# Patient Record
Sex: FEMALE | Race: WHITE | NOT HISPANIC OR LATINO | URBAN - METROPOLITAN AREA
[De-identification: names, ages, dates, MRNs, and addresses within clinical notes are randomized per-mention and may not be internally consistent; named-entity substitution may affect disease eponyms.]

---

## 2020-03-10 ENCOUNTER — OFFICE VISIT (OUTPATIENT)
Dept: INTERNAL MEDICINE | Facility: CLINIC | Age: 60
End: 2020-03-10
Payer: COMMERCIAL

## 2020-03-10 VITALS
HEIGHT: 62 IN | SYSTOLIC BLOOD PRESSURE: 146 MMHG | HEART RATE: 99 BPM | OXYGEN SATURATION: 97 % | TEMPERATURE: 100 F | DIASTOLIC BLOOD PRESSURE: 84 MMHG | WEIGHT: 139.31 LBS | BODY MASS INDEX: 25.64 KG/M2

## 2020-03-10 DIAGNOSIS — N20.0 RENAL STONES: ICD-10-CM

## 2020-03-10 DIAGNOSIS — Z85.6 PERSONAL HISTORY OF CLL (CHRONIC LYMPHOCYTIC LEUKEMIA): ICD-10-CM

## 2020-03-10 DIAGNOSIS — R05.9 COUGH: ICD-10-CM

## 2020-03-10 DIAGNOSIS — R50.9 FEVER, UNSPECIFIED FEVER CAUSE: Primary | ICD-10-CM

## 2020-03-10 DIAGNOSIS — D75.839 THROMBOCYTOSIS: ICD-10-CM

## 2020-03-10 PROCEDURE — 3008F PR BODY MASS INDEX (BMI) DOCUMENTED: ICD-10-PCS | Mod: CPTII,S$GLB,, | Performed by: FAMILY MEDICINE

## 2020-03-10 PROCEDURE — 99999 PR PBB SHADOW E&M-NEW PATIENT-LVL III: CPT | Mod: PBBFAC,,, | Performed by: FAMILY MEDICINE

## 2020-03-10 PROCEDURE — 99203 PR OFFICE/OUTPT VISIT, NEW, LEVL III, 30-44 MIN: ICD-10-PCS | Mod: S$GLB,,, | Performed by: FAMILY MEDICINE

## 2020-03-10 PROCEDURE — 99203 OFFICE O/P NEW LOW 30 MIN: CPT | Mod: S$GLB,,, | Performed by: FAMILY MEDICINE

## 2020-03-10 PROCEDURE — 99999 PR PBB SHADOW E&M-NEW PATIENT-LVL III: ICD-10-PCS | Mod: PBBFAC,,, | Performed by: FAMILY MEDICINE

## 2020-03-10 PROCEDURE — 3008F BODY MASS INDEX DOCD: CPT | Mod: CPTII,S$GLB,, | Performed by: FAMILY MEDICINE

## 2020-03-10 RX ORDER — ZOLPIDEM TARTRATE 10 MG/1
10 TABLET ORAL
COMMUNITY
Start: 2019-06-18

## 2020-03-10 RX ORDER — BETHANECHOL CHLORIDE 50 MG/1
50 TABLET ORAL
COMMUNITY
Start: 2020-03-07 | End: 2021-03-07

## 2020-03-10 RX ORDER — NITROFURANTOIN 25; 75 MG/1; MG/1
50 CAPSULE ORAL
COMMUNITY
End: 2020-04-08

## 2020-03-10 RX ORDER — ONDANSETRON 4 MG/1
4 TABLET, ORALLY DISINTEGRATING ORAL EVERY 8 HOURS PRN
COMMUNITY
Start: 2020-03-07 | End: 2020-03-14

## 2020-03-10 RX ORDER — MULTIVITAMIN
1 TABLET ORAL
COMMUNITY

## 2020-03-10 RX ORDER — TRIAMCINOLONE ACETONIDE 55 UG/1
2 SPRAY, METERED NASAL
COMMUNITY

## 2020-03-12 ENCOUNTER — TELEPHONE (OUTPATIENT)
Dept: INTERNAL MEDICINE | Facility: CLINIC | Age: 60
End: 2020-03-12

## 2020-03-12 NOTE — TELEPHONE ENCOUNTER
Would she want to see another hematologist? I don't know what else to suggest. I don't think it is necessarily from the CLL but they need to figure out why the platelet level is so high.

## 2020-03-12 NOTE — TELEPHONE ENCOUNTER
Patient's daughter said she spoke to  office yesterday. He said it has nothing to do with CLL and to contact urologist. She seen her urologist yesterday and was taking off of macrobid. Her next appointment with infection disease is Tuesday. She said  said he was not concerned and told her he can see her in June. She just wanted to get your opinion.

## 2020-03-12 NOTE — TELEPHONE ENCOUNTER
Please call Dr. Tin Duncan's office, see if he can get her in soon. She needs to see her hematologist.

## 2020-03-12 NOTE — TELEPHONE ENCOUNTER
Patient's daughter Ingird came in concerned about patient. She states she is feeling worst today. Patient having headache and blood pressure keeps going up and down. She just did blood work at [facility] with infectious disease doctor. She states you was concerned about platelet count when it was in the 800's and now it is in the 900's. She wanted to know what you would like them to do. She states she is scared to bring her to the hospital and her sit in the waiting area exposed to everything going around.

## 2020-03-12 NOTE — TELEPHONE ENCOUNTER
Patient's daughter said she thinks she may be severely dehydrated. She will send you her lab results via [facility]. She wanted to know what you think about home health. She also said they we're able to get an appointment in April.

## 2020-03-15 NOTE — PROGRESS NOTES
Subjective:      Patient ID: Cindy Prince is a 59 y.o. female.    Chief Complaint: Establish Care; Urinary Tract Infection; Cough; Emesis; Chills; and Fever      Patient here today to establish care.  She has been very ill for several months now, has been hospital twice with diagnosis of UTI.  She has been unable to tolerate many p.o. antibiotics.  Today she reports still feeling feverish very weak, nauseated and vomiting.  She is having difficulty holding things down.  She reports this morning went to see an infectious disease specialist , he ordered multiple tests but they have no results yet on this.  She has history over 20 years ago of CLL and is followed by Dr. Devon canales for this, however he does not think that has anything to do with her recent illness.      Review of Systems   Constitutional: Positive for activity change, appetite change, fatigue and fever.   HENT: Positive for congestion, rhinorrhea, sinus pressure and sore throat.    Respiratory: Positive for cough. Negative for shortness of breath and wheezing.    Gastrointestinal: Positive for nausea and vomiting. Negative for abdominal pain.   Genitourinary: Positive for frequency.   Musculoskeletal: Positive for myalgias.   Skin: Negative for rash.   Neurological: Positive for weakness and headaches.     Past Medical History:   Diagnosis Date    Hyperkalemia     Insomnia     SVT (supraventricular tachycardia)           Past Surgical History:   Procedure Laterality Date    CHOLECYSTECTOMY      KNEE SURGERY Left     LAPAROSCOPIC SURGICAL REMOVAL OF DISTAL PANCREAS WITH REMOVAL OF SPLEEN      LITHOTRIPSY      TONSILLECTOMY       Family History   Problem Relation Age of Onset    Pneumonia Mother     Diabetes Father      Social History     Socioeconomic History    Marital status:      Spouse name: Not on file    Number of children: Not on file    Years of education: Not on file    Highest education level: Not on file  "  Occupational History    Not on file   Social Needs    Financial resource strain: Not on file    Food insecurity:     Worry: Not on file     Inability: Not on file    Transportation needs:     Medical: Not on file     Non-medical: Not on file   Tobacco Use    Smoking status: Never Smoker   Substance and Sexual Activity    Alcohol use: Not Currently    Drug use: Never    Sexual activity: Not on file   Lifestyle    Physical activity:     Days per week: Not on file     Minutes per session: Not on file    Stress: Not at all   Relationships    Social connections:     Talks on phone: Not on file     Gets together: Not on file     Attends Buddhist service: Not on file     Active member of club or organization: Not on file     Attends meetings of clubs or organizations: Not on file     Relationship status: Not on file   Other Topics Concern    Not on file   Social History Narrative    Not on file     Review of patient's allergies indicates:   Allergen Reactions    Cefdinir Other (See Comments)     Make tongue raw       Objective:       BP (!) 146/84 (BP Location: Right arm, Patient Position: Sitting, BP Method: Large (Manual))   Pulse 99   Temp 99.9 °F (37.7 °C) (Tympanic)   Ht 5' 2" (1.575 m)   Wt 63.2 kg (139 lb 5.3 oz)   SpO2 97%   BMI 25.48 kg/m²   Physical Exam   Constitutional: She is oriented to person, place, and time. Vital signs are normal. She appears well-developed and well-nourished. She appears distressed (Appears weak, in wheelchair).   HENT:   Head: Normocephalic and atraumatic.   Right Ear: Hearing, tympanic membrane, external ear and ear canal normal.   Left Ear: Hearing, tympanic membrane, external ear and ear canal normal.   Nose: Nose normal.   Mouth/Throat: Uvula is midline, oropharynx is clear and moist and mucous membranes are normal. No oropharyngeal exudate.   Eyes: Pupils are equal, round, and reactive to light. Conjunctivae and EOM are normal.   Cardiovascular: Normal rate, " regular rhythm, normal heart sounds and intact distal pulses.   No murmur heard.  Pulmonary/Chest: Effort normal and breath sounds normal. No respiratory distress.   Abdominal: Soft. Bowel sounds are normal.   Neurological: She is alert and oriented to person, place, and time.   Skin: Skin is warm and dry. Capillary refill takes less than 2 seconds. She is not diaphoretic.   Psychiatric: She has a normal mood and affect. Her behavior is normal. Judgment and thought content normal.   Vitals reviewed.    Assessment:     1. Fever, unspecified fever cause    2. Cough    3. Personal history of CLL (chronic lymphocytic leukemia)    4. Renal stones    5. Thrombocytosis      Plan:   Fever, unspecified fever cause    Cough    Personal history of CLL (chronic lymphocytic leukemia)    Renal stones    Thrombocytosis     Will await results from her visit this morning.  On review of chart in care everywhere her platelets have been increasing significantly, think she needs to see a hematologist regarding this.  Medication List with Changes/Refills   Current Medications    BETHANECHOL (URECHOLINE) 50 MG TABLET    Take 50 mg by mouth.    MULTIVITAMIN (THERAGRAN) PER TABLET    Take 1 tablet by mouth.    NITROFURANTOIN, MACROCRYSTAL-MONOHYDRATE, (MACROBID) 100 MG CAPSULE    Take 50 mg by mouth.    ONDANSETRON (ZOFRAN-ODT) 4 MG TBDL    Take 4 mg by mouth every 8 (eight) hours as needed.    TRIAMCINOLONE (NASACORT) 55 MCG NASAL INHALER    2 sprays by Nasal route.    ZOLPIDEM (AMBIEN) 10 MG TAB    Take 10 mg by mouth.

## 2020-03-23 ENCOUNTER — TELEPHONE (OUTPATIENT)
Dept: INTERNAL MEDICINE | Facility: CLINIC | Age: 60
End: 2020-03-23

## 2020-03-23 NOTE — TELEPHONE ENCOUNTER
[facility] calling to state that they rec'd a referral from Dr. Merchant for Home Health. They are stating that they are not sure of why and what the patient can do with home health. She stated that Dr. Merchant ordered it because that is what the family ask for. They are saying they need a office visit and order from PCP. Please Advise    I will contact Delicia at [facility] at [phone]

## 2020-03-24 ENCOUNTER — TELEPHONE (OUTPATIENT)
Dept: INTERNAL MEDICINE | Facility: CLINIC | Age: 60
End: 2020-03-24

## 2020-03-24 NOTE — TELEPHONE ENCOUNTER
----- Message from Mirna Srivastava sent at 3/24/2020 11:54 AM CDT -----  Contact: /[place]  Please call pt  @ [phone] regarding pt Home Health order to admit, need to know status.

## 2020-03-24 NOTE — TELEPHONE ENCOUNTER
Spoke with patient's  and he stated that they are wanting Dr. Alarcon to be her PCP and that Thony PALENCIA and ID will keep the provider posted with what's going on with the patient.

## 2020-04-07 ENCOUNTER — TELEPHONE (OUTPATIENT)
Dept: INTERNAL MEDICINE | Facility: CLINIC | Age: 60
End: 2020-04-07

## 2020-04-08 ENCOUNTER — OFFICE VISIT (OUTPATIENT)
Dept: INTERNAL MEDICINE | Facility: CLINIC | Age: 60
End: 2020-04-08
Payer: COMMERCIAL

## 2020-04-08 ENCOUNTER — PATIENT MESSAGE (OUTPATIENT)
Dept: INTERNAL MEDICINE | Facility: CLINIC | Age: 60
End: 2020-04-08

## 2020-04-08 VITALS — DIASTOLIC BLOOD PRESSURE: 78 MMHG | SYSTOLIC BLOOD PRESSURE: 119 MMHG | HEART RATE: 96 BPM

## 2020-04-08 DIAGNOSIS — R51.9 CHRONIC NONINTRACTABLE HEADACHE, UNSPECIFIED HEADACHE TYPE: Primary | ICD-10-CM

## 2020-04-08 DIAGNOSIS — R70.0 ESR RAISED: ICD-10-CM

## 2020-04-08 DIAGNOSIS — G89.29 CHRONIC NONINTRACTABLE HEADACHE, UNSPECIFIED HEADACHE TYPE: Primary | ICD-10-CM

## 2020-04-08 PROCEDURE — 99213 OFFICE O/P EST LOW 20 MIN: CPT | Mod: 95,,, | Performed by: FAMILY MEDICINE

## 2020-04-08 PROCEDURE — 99213 PR OFFICE/OUTPT VISIT, EST, LEVL III, 20-29 MIN: ICD-10-PCS | Mod: 95,,, | Performed by: FAMILY MEDICINE

## 2020-04-08 RX ORDER — CYPROHEPTADINE HYDROCHLORIDE 4 MG/1
4 TABLET ORAL 2 TIMES DAILY PRN
COMMUNITY

## 2020-04-08 RX ORDER — PREDNISONE 10 MG/1
10 TABLET ORAL DAILY
Qty: 42 TABLET | Refills: 0 | Status: SHIPPED | OUTPATIENT
Start: 2020-04-08

## 2020-04-08 NOTE — PROGRESS NOTES
The patient location is: her home  The chief complaint leading to consultation is: follow up  Visit type: Virtual visit with synchronous audio and video  Total time spent with patient: 20 min  Each patient to whom he or she provides medical services by telemedicine is:  (1) informed of the relationship between the physician and patient and the respective role of any other health care provider with respect to management of the patient; and (2) notified that he or she may decline to receive medical services by telemedicine and may withdraw from such care at any time.    Notes:     Subjective:      Patient ID: Cindy Prince is a 59 y.o. female.    Chief Complaint: follow up    Patient here for follow-up.  Overall she is doing better.  Reports her urinary infections have seem to clear up after completing course of Keflex.  She is seen her infectious disease specialist Dr. Ryan  several times recently, also saw her oncologist Dr. canales last week.  Labs drawn last Friday were improved, elevated WBC and platelet counts improving.  Her main complaint now is continued lack of appetite (has lost about 25 lb since the beginning of the year), headaches which are triggered with eye movement in frontal area and temple, and then pain in shoulders and neck which is worse in the mornings.  Dr. Ryan suspects possible PMR/TA, recommended trial of course of prednisone or referral to specialist for possible temporal artery biopsy.    Review of Systems   Constitutional: Positive for activity change, appetite change and fatigue.   Eyes: Positive for pain. Negative for visual disturbance.   Genitourinary: Negative for difficulty urinating and dysuria.   Musculoskeletal: Positive for arthralgias, myalgias and neck pain.   Neurological: Positive for headaches. Negative for dizziness and facial asymmetry.     Past Medical History:   Diagnosis Date    Hyperkalemia     Insomnia     SVT (supraventricular tachycardia)           Past  Surgical History:   Procedure Laterality Date    CHOLECYSTECTOMY      KNEE SURGERY Left     LAPAROSCOPIC SURGICAL REMOVAL OF DISTAL PANCREAS WITH REMOVAL OF SPLEEN      LITHOTRIPSY      TONSILLECTOMY       Family History   Problem Relation Age of Onset    Pneumonia Mother     Diabetes Father      Social History     Socioeconomic History    Marital status:      Spouse name: Not on file    Number of children: Not on file    Years of education: Not on file    Highest education level: Not on file   Occupational History    Not on file   Social Needs    Financial resource strain: Not on file    Food insecurity:     Worry: Not on file     Inability: Not on file    Transportation needs:     Medical: Not on file     Non-medical: Not on file   Tobacco Use    Smoking status: Never Smoker   Substance and Sexual Activity    Alcohol use: Not Currently    Drug use: Never    Sexual activity: Not on file   Lifestyle    Physical activity:     Days per week: Not on file     Minutes per session: Not on file    Stress: Not at all   Relationships    Social connections:     Talks on phone: Not on file     Gets together: Not on file     Attends Scientologist service: Not on file     Active member of club or organization: Not on file     Attends meetings of clubs or organizations: Not on file     Relationship status: Not on file   Other Topics Concern    Not on file   Social History Narrative    Not on file     Review of patient's allergies indicates:   Allergen Reactions    Cefdinir Other (See Comments)     Make tongue raw       Objective:       /78 Comment: at home this morning  Pulse 96   Physical Exam   Patient visualized - no distress  Assessment:     1. Chronic nonintractable headache, unspecified headache type    2. ESR raised      Plan:   Chronic nonintractable headache, unspecified headache type  -     Ambulatory referral/consult to Neurology; Future; Expected date: 04/15/2020    ESR raised  -      Ambulatory referral/consult to Rheumatology; Future; Expected date: 04/15/2020    Other orders  -     predniSONE (DELTASONE) 10 MG tablet; Take 1 tablet (10 mg total) by mouth once daily.  Dispense: 42 tablet; Refill: 0     Discussed with patient and her  that due to the corona virus situation, all not emergent procedures have been put on hold for now.  Will refer her to see neurologist and rheumatologist but would recommend starting trial of prednisone  Medication List with Changes/Refills   New Medications    PREDNISONE (DELTASONE) 10 MG TABLET    Take 1 tablet (10 mg total) by mouth once daily.   Current Medications    BETHANECHOL (URECHOLINE) 50 MG TABLET    Take 50 mg by mouth.    CYPROHEPTADINE (PERIACTIN) 4 MG TABLET    Take 4 mg by mouth 2 (two) times daily as needed.    MULTIVITAMIN (THERAGRAN) PER TABLET    Take 1 tablet by mouth.    TRIAMCINOLONE (NASACORT) 55 MCG NASAL INHALER    2 sprays by Nasal route.    ZOLPIDEM (AMBIEN) 10 MG TAB    Take 10 mg by mouth.   Discontinued Medications    NITROFURANTOIN, MACROCRYSTAL-MONOHYDRATE, (MACROBID) 100 MG CAPSULE    Take 50 mg by mouth.

## 2020-04-13 ENCOUNTER — TELEPHONE (OUTPATIENT)
Dept: RHEUMATOLOGY | Facility: CLINIC | Age: 60
End: 2020-04-13

## 2020-04-13 ENCOUNTER — PATIENT MESSAGE (OUTPATIENT)
Dept: RHEUMATOLOGY | Facility: CLINIC | Age: 60
End: 2020-04-13

## 2020-04-13 NOTE — TELEPHONE ENCOUNTER
Sent portal message to ask patient to reschedule her New Patient appt when clinic is back re-opened for in person visits

## 2020-04-22 ENCOUNTER — TELEPHONE (OUTPATIENT)
Dept: RHEUMATOLOGY | Facility: CLINIC | Age: 60
End: 2020-04-22

## 2020-07-31 DIAGNOSIS — Z12.39 BREAST CANCER SCREENING: ICD-10-CM

## 2020-08-11 ENCOUNTER — PATIENT OUTREACH (OUTPATIENT)
Dept: ADMINISTRATIVE | Facility: HOSPITAL | Age: 60
End: 2020-08-11

## 2020-10-06 ENCOUNTER — PATIENT MESSAGE (OUTPATIENT)
Dept: ADMINISTRATIVE | Facility: HOSPITAL | Age: 60
End: 2020-10-06

## 2020-10-30 ENCOUNTER — PATIENT MESSAGE (OUTPATIENT)
Dept: ADMINISTRATIVE | Facility: HOSPITAL | Age: 60
End: 2020-10-30

## 2020-11-27 ENCOUNTER — TELEPHONE (OUTPATIENT)
Dept: ADMINISTRATIVE | Facility: HOSPITAL | Age: 60
End: 2020-11-27

## 2021-02-18 ENCOUNTER — PATIENT OUTREACH (OUTPATIENT)
Dept: ADMINISTRATIVE | Facility: HOSPITAL | Age: 61
End: 2021-02-18

## 2021-05-28 ENCOUNTER — TELEPHONE (OUTPATIENT)
Dept: ADMINISTRATIVE | Facility: HOSPITAL | Age: 61
End: 2021-05-28

## 2021-06-16 ENCOUNTER — TELEPHONE (OUTPATIENT)
Dept: ADMINISTRATIVE | Facility: HOSPITAL | Age: 61
End: 2021-06-16

## 2021-10-06 DIAGNOSIS — Z12.31 OTHER SCREENING MAMMOGRAM: ICD-10-CM
